# Patient Record
Sex: FEMALE | Race: WHITE | Employment: UNEMPLOYED | URBAN - METROPOLITAN AREA
[De-identification: names, ages, dates, MRNs, and addresses within clinical notes are randomized per-mention and may not be internally consistent; named-entity substitution may affect disease eponyms.]

---
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
## 2022-03-19 PROBLEM — M22.8X9 PATELLAR MALTRACKING: Status: ACTIVE | Noted: 2019-07-17

## 2022-03-20 PROBLEM — M22.8X2 PATELLAR MALTRACKING, LEFT: Status: ACTIVE | Noted: 2019-07-17

## 2022-08-24 ENCOUNTER — OFFICE VISIT (OUTPATIENT)
Dept: ORTHOPEDIC SURGERY | Age: 22
End: 2022-08-24
Payer: MEDICAID

## 2022-08-24 VITALS — BODY MASS INDEX: 31.5 KG/M2 | WEIGHT: 220 LBS | HEIGHT: 70 IN

## 2022-08-24 DIAGNOSIS — M25.562 ACUTE PAIN OF LEFT KNEE: Primary | ICD-10-CM

## 2022-08-24 DIAGNOSIS — M54.50 LOW BACK PAIN, UNSPECIFIED BACK PAIN LATERALITY, UNSPECIFIED CHRONICITY, UNSPECIFIED WHETHER SCIATICA PRESENT: ICD-10-CM

## 2022-08-24 PROCEDURE — 99213 OFFICE O/P EST LOW 20 MIN: CPT | Performed by: ORTHOPAEDIC SURGERY

## 2022-08-24 NOTE — PROGRESS NOTES
Teresita Dobbs (: 2000) is a 25 y.o. female patient, here for evaluation of the following chief complaint(s):  Follow-up (Left knee that goes all the way up to hip)       ASSESSMENT/PLAN:  Below is the assessment and plan developed based on review of pertinent history, physical exam, labs, studies, and medications. Plan we are going to start her on physical therapy. We will see her back in the office in 6 weeks. 1. Acute pain of left knee  -     XR KNEE LT MIN 4 V; Future  2. Low back pain, unspecified back pain laterality, unspecified chronicity, unspecified whether sciatica present  -     XR SPINE LUMB 2 OR 3 V; Future  -     REFERRAL TO PHYSICAL THERAPY      Return in about 6 weeks (around 10/5/2022). SUBJECTIVE/OBJECTIVE:  Teresita Dobbs (: 2000) is a 25 y.o. female who presents today for the following:  Chief Complaint   Patient presents with    Follow-up     Left knee that goes all the way up to hip       Chip August the office today with complaints of left leg pain. She did have a prior surgery of the left knee however reports this is very different pain she reports it goes from her hip down to her knee all on the lateral aspect of the leg. She denies any history of trauma. She reports that she was just simply walking at work. IMAGING:    XR Results (maximum last 2): Results from Appointment encounter on 22    XR SPINE LUMB 2 OR 3 V    Narrative  Radiographs taken in the office today include AP and lateral of the lumbar spine. These show significant straightening of the lumbar spine but no evidence of acute fracture, dislocation, or congenital abnormality. XR KNEE LT MIN 4 V    Narrative  S taken the office today include AP lateral sunrise and tunnel views these show MPFL reconstruction in place with no abnormalities at this time noted.           No Known Allergies    Current Outpatient Medications   Medication Sig    OTHER BIRTH CONTROL PILLS DAILY    ondansetron (ZOFRAN ODT) 8 mg disintegrating tablet Take 1 Tab by mouth every eight (8) hours as needed for Nausea. (Patient not taking: Reported on 8/24/2022)    naproxen sodium (NAPROSYN) 220 mg tablet Take 220 mg by mouth as needed. (Patient not taking: Reported on 8/24/2022)     No current facility-administered medications for this visit. Past Medical History:   Diagnosis Date    Chronic pain     LEFT KNEE        Past Surgical History:   Procedure Laterality Date    HX HEENT  2013    ORAL SURGERY FOR NO CANINES    HX ORTHOPAEDIC Right     ANKLE SCAR TISSUE REMOVED       Family History   Problem Relation Age of Onset    No Known Problems Mother     No Known Problems Father     No Known Problems Brother     Anesth Problems Neg Hx         Social History     Tobacco Use    Smoking status: Never    Smokeless tobacco: Never   Substance Use Topics    Alcohol use: Never        Review of Systems     No flowsheet data found. Vitals:  Ht 5' 10\" (1.778 m)   Wt 220 lb (99.8 kg)   BMI 31.57 kg/m²    Body mass index is 31.57 kg/m². Physical Exam    Examination of the left lower extremity shows that she does have slight a positive straight leg raise test.  She can flex extend side bend and rotate at the spine she does get some pain down into the leg with this. She has no consistent tenderness palpation overlying the knee her patella tracking looks great she has no evidence of instability. She has brisk capillary refill throughout. An electronic signature was used to authenticate this note.   -- Pérez Ly MD

## 2022-08-24 NOTE — LETTER
8/24/2022    Patient: Fozia Avila   YOB: 2000   Date of Visit: 8/24/2022     Bernie Dennis MD  [address] [address] [address]  Via Fax: [phone]    Dear Bernie Dennis MD,      Thank you for referring Ms. Fozia Avila to [facility] for evaluation. My notes for this consultation are attached. If you have questions, please do not hesitate to call me. I look forward to following your patient along with you.       Sincerely,    Ian Kilpatrick MD

## 2022-10-31 ENCOUNTER — OFFICE VISIT (OUTPATIENT)
Dept: ORTHOPEDIC SURGERY | Age: 22
End: 2022-10-31
Payer: MEDICAID

## 2022-10-31 VITALS — BODY MASS INDEX: 31.5 KG/M2 | WEIGHT: 220 LBS | HEIGHT: 70 IN

## 2022-10-31 DIAGNOSIS — M25.562 ACUTE PAIN OF LEFT KNEE: Primary | ICD-10-CM

## 2022-10-31 PROCEDURE — 99213 OFFICE O/P EST LOW 20 MIN: CPT | Performed by: ORTHOPAEDIC SURGERY

## 2022-10-31 RX ORDER — NORETHINDRONE ACETATE AND ETHINYL ESTRADIOL, AND FERROUS FUMARATE 1MG-20(24)
KIT ORAL
COMMUNITY
Start: 2022-09-20

## 2022-10-31 NOTE — PROGRESS NOTES
Cheryle Banco (: 2000) is a 25 y.o. female patient, here for evaluation of the following chief complaint(s):  Follow-up (Left knee)       ASSESSMENT/PLAN:  Below is the assessment and plan developed based on review of pertinent history, physical exam, labs, studies, and medications. Plan we will allow her to return to activity as tolerated. We will see her back on appearing basis. 1. Acute pain of left knee      Return if symptoms worsen or fail to improve. SUBJECTIVE/OBJECTIVE:  Cheryle Banco (: 2000) is a 25 y.o. female who presents today for the following:  Chief Complaint   Patient presents with    Follow-up     Left knee       Patient presents the office today follow-up evaluation left knee reports feeling better has no complaints at this time. I would like to get cleared to go back to work. IMAGING:    No new radiographs taken the office today. No Known Allergies    Current Outpatient Medications   Medication Sig    Angelia 24 Fe 1 mg-20 mcg (24)/75 mg (4) tab      No current facility-administered medications for this visit. Past Medical History:   Diagnosis Date    Asthma         Past Surgical History:   Procedure Laterality Date    HX ORTHOPAEDIC      knee , ankle    HX WISDOM TEETH EXTRACTION N/A        History reviewed. No pertinent family history. Social History     Tobacco Use    Smoking status: Never     Passive exposure: Current    Smokeless tobacco: Never   Substance Use Topics    Alcohol use: Not on file        Review of Systems     No flowsheet data found. Vitals:  Ht 5' 10\" (1.778 m)   Wt 220 lb (99.8 kg)   BMI 31.57 kg/m²    Body mass index is 31.57 kg/m². Physical Exam    Examination left knee shows full pain-free range of motion. She has no tenderness palpation. She is no skin lesions. Prior surgical wounds are well-healed. She is good stability the patella. There is no pain with motion.       An electronic signature was used to authenticate this note.   -- Gabi Spivey MD

## 2023-05-25 RX ORDER — NORETHINDRONE ACETATE AND ETHINYL ESTRADIOL, AND FERROUS FUMARATE 1MG-20(24)
KIT ORAL
COMMUNITY
Start: 2022-09-20

## 2025-04-22 ENCOUNTER — HOSPITAL ENCOUNTER (OUTPATIENT)
Facility: HOSPITAL | Age: 25
Discharge: HOME | End: 2025-04-25
Payer: COMMERCIAL

## 2025-04-22 DIAGNOSIS — R19.04 LEFT LOWER QUADRANT ABDOMINAL SWELLING, MASS AND LUMP: ICD-10-CM

## 2025-04-22 PROCEDURE — 76700 US EXAM ABDOM COMPLETE: CPT

## (undated) DEVICE — SOLUTION IRRIG 3000ML LAC R FLX CONT

## (undated) DEVICE — DRAPE SHT 3 QTR PROXIMA 53X77 --

## (undated) DEVICE — ROCKER SWITCH PENCIL BLADE ELECTRODE, HOLSTER: Brand: EDGE

## (undated) DEVICE — REM POLYHESIVE ADULT PATIENT RETURN ELECTRODE: Brand: VALLEYLAB

## (undated) DEVICE — FRAZIER SUCTION INSTRUMENT 12 FR W/CONTROL VENT & OBTURATOR: Brand: FRAZIER

## (undated) DEVICE — STRAP,POSITIONING,KNEE/BODY,FOAM,4X60": Brand: MEDLINE

## (undated) DEVICE — IMMOBILIZER PREMIER PRO KNEE CUTAWAY CNTOUR 22INCH COOL BLU

## (undated) DEVICE — 4-PORT MANIFOLD: Brand: NEPTUNE 2

## (undated) DEVICE — SURGICAL PROCEDURE PACK BASIN MAJ SET CUST NO CAUT

## (undated) DEVICE — DYONICS 25 INFLOW/OUTFLOW TUBE                                    SET, SINGLE SUCTION, 3 PER BOX

## (undated) DEVICE — STRIP,CLOSURE,WOUND,MEDI-STRIP,1/2X4: Brand: MEDLINE

## (undated) DEVICE — INFECTION CONTROL KIT SYS

## (undated) DEVICE — ARTHROSCOPY RICHMOND-LF: Brand: MEDLINE INDUSTRIES, INC.

## (undated) DEVICE — DYONICS 25 INFLOW TUBE SET, 3 PER BOX

## (undated) DEVICE — SUTURE VCRL SZ 2-0 L27IN ABSRB UD L26MM SH 1/2 CIR J417H

## (undated) DEVICE — STERILE POLYISOPRENE POWDER-FREE SURGICAL GLOVES WITH EMOLLIENT COATING: Brand: PROTEXIS

## (undated) DEVICE — DRAPE C-ARMOUR C-ARM KIT --

## (undated) DEVICE — SOLUTION IV 1000ML 0.9% SOD CHL

## (undated) DEVICE — ZIMMER® STERILE DISPOSABLE TOURNIQUET CUFF WITH PLC, DUAL PORT, SINGLE BLADDER, 34 IN. (86 CM)

## (undated) DEVICE — SPONGE GZ W4XL4IN COT 12 PLY TYP VII WVN C FLD DSGN

## (undated) DEVICE — (D)PREP SKN CHLRAPRP APPL 26ML -- CONVERT TO ITEM 371833

## (undated) DEVICE — DRAPE,EXTREMITY,89X128,STERILE: Brand: MEDLINE

## (undated) DEVICE — DRESSING PETRO W3XL8IN N ADH OIL EMUL GZ CURAD

## (undated) DEVICE — SUTURE MCRYL SZ 4 0 L18IN ABSRB UD PC 5 L19MM 3 8 CIR SGL Y823G